# Patient Record
Sex: FEMALE | Race: BLACK OR AFRICAN AMERICAN | NOT HISPANIC OR LATINO | Employment: FULL TIME | URBAN - METROPOLITAN AREA
[De-identification: names, ages, dates, MRNs, and addresses within clinical notes are randomized per-mention and may not be internally consistent; named-entity substitution may affect disease eponyms.]

---

## 2023-12-06 ENCOUNTER — OFFICE VISIT (OUTPATIENT)
Dept: URGENT CARE | Facility: CLINIC | Age: 28
End: 2023-12-06
Payer: COMMERCIAL

## 2023-12-06 VITALS
HEART RATE: 69 BPM | BODY MASS INDEX: 30.91 KG/M2 | HEIGHT: 62 IN | DIASTOLIC BLOOD PRESSURE: 85 MMHG | RESPIRATION RATE: 20 BRPM | OXYGEN SATURATION: 100 % | TEMPERATURE: 98 F | WEIGHT: 168 LBS | SYSTOLIC BLOOD PRESSURE: 130 MMHG

## 2023-12-06 DIAGNOSIS — R11.0 NAUSEA: Primary | ICD-10-CM

## 2023-12-06 DIAGNOSIS — R51.9 ACUTE NONINTRACTABLE HEADACHE, UNSPECIFIED HEADACHE TYPE: ICD-10-CM

## 2023-12-06 LAB
CTP QC/QA: YES
POC MOLECULAR INFLUENZA A AGN: NEGATIVE
POC MOLECULAR INFLUENZA B AGN: NEGATIVE

## 2023-12-06 PROCEDURE — 99203 PR OFFICE/OUTPT VISIT, NEW, LEVL III, 30-44 MIN: ICD-10-PCS | Mod: ,,,

## 2023-12-06 PROCEDURE — 87502 INFLUENZA DNA AMP PROBE: CPT | Mod: QW,,,

## 2023-12-06 PROCEDURE — 99203 OFFICE O/P NEW LOW 30 MIN: CPT | Mod: ,,,

## 2023-12-06 PROCEDURE — 87502 POCT INFLUENZA A/B MOLECULAR: ICD-10-PCS | Mod: QW,,,

## 2023-12-06 RX ORDER — ONDANSETRON 4 MG/1
4 TABLET, ORALLY DISINTEGRATING ORAL EVERY 8 HOURS PRN
Qty: 15 TABLET | Refills: 0 | Status: SHIPPED | OUTPATIENT
Start: 2023-12-06

## 2023-12-06 RX ORDER — DICYCLOMINE HYDROCHLORIDE 10 MG/1
10 CAPSULE ORAL 2 TIMES DAILY PRN
Qty: 15 CAPSULE | Refills: 0 | Status: SHIPPED | OUTPATIENT
Start: 2023-12-06

## 2023-12-06 NOTE — PROGRESS NOTES
"Subjective:      Patient ID: Coco Cee is a 28 y.o. female.    Vitals:  height is 5' 2" (1.575 m) and weight is 76.2 kg (168 lb). Her oral temperature is 98.3 °F (36.8 °C). Her blood pressure is 130/85 and her pulse is 69. Her respiration is 20 and oxygen saturation is 100%.     Chief Complaint: Headache (Headache, body aches, nausea x 2 days.)    Patient is a 28-year-old female that presents complaining of headache, body aches, nausea x2 days vomiting on Monday but none today. Patient denies any SOB, CP, rash, v/d, or neck stiffness.      Headache   Associated symptoms include nausea.       Constitution:        Body aches   Gastrointestinal:  Positive for nausea.   Neurological:  Positive for headaches.      Objective:     Physical Exam   Constitutional: She is oriented to person, place, and time.  Non-toxic appearance. She does not appear ill.   HENT:   Ears:   Right Ear: Tympanic membrane, external ear and ear canal normal.   Left Ear: Tympanic membrane, external ear and ear canal normal.   Mouth/Throat: Mucous membranes are moist. No posterior oropharyngeal erythema. Oropharynx is clear.   Eyes: Conjunctivae are normal.   Cardiovascular: Normal rate and normal pulses.   Pulmonary/Chest: Effort normal and breath sounds normal.   Abdominal: Normal appearance and bowel sounds are normal. Soft. There is abdominal tenderness (Generalized).   Musculoskeletal: Normal range of motion.         General: Normal range of motion.   Neurological: She is alert and oriented to person, place, and time.   Skin: Skin is warm, dry and no rash. Capillary refill takes less than 2 seconds.   Psychiatric: Her behavior is normal. Mood normal.       Assessment:     1. Nausea    2. Acute nonintractable headache, unspecified headache type           Previous History      Review of patient's allergies indicates:  No Known Allergies    Past Medical History:   Diagnosis Date    Known health problems: none      Current Outpatient " "Medications   Medication Instructions    dicyclomine (BENTYL) 10 mg, Oral, 2 times daily PRN    ondansetron (ZOFRAN-ODT) 4 mg, Oral, Every 8 hours PRN     Past Surgical History:   Procedure Laterality Date    NO PAST SURGERIES       Family History   Problem Relation Age of Onset    Seizures Mother     Diabetes Father        Social History     Tobacco Use    Smoking status: Never    Smokeless tobacco: Never   Substance Use Topics    Alcohol use: Not Currently    Drug use: Never        Physical Exam      Vital Signs Reviewed   /85 (BP Location: Right arm)   Pulse 69   Temp 98.3 °F (36.8 °C) (Oral)   Resp 20   Ht 5' 2" (1.575 m)   Wt 76.2 kg (168 lb)   LMP 12/01/2023   SpO2 100%   BMI 30.73 kg/m²        Procedures    Procedures     Labs     Results for orders placed or performed in visit on 12/06/23   POCT Influenza A/B MOLECULAR   Result Value Ref Range    POC Molecular Influenza A Ag Negative Negative, Not Reported    POC Molecular Influenza B Ag Negative Negative, Not Reported     Acceptable Yes       Plan:       Nausea  -     POCT Influenza A/B MOLECULAR  -     ondansetron (ZOFRAN-ODT) 4 MG TbDL; Take 1 tablet (4 mg total) by mouth every 8 (eight) hours as needed (nausea).  Dispense: 15 tablet; Refill: 0  -     dicyclomine (BENTYL) 10 MG capsule; Take 1 capsule (10 mg total) by mouth 2 (two) times daily as needed (abdominal cramps).  Dispense: 15 capsule; Refill: 0    Acute nonintractable headache, unspecified headache type        Drink lots of fluids. Clear liquids today, then slowly resume your usual diet starting with crackers, toast, soup when your appetite returns. Bananas, rice, applesauce, and toast to firm up stool when your appetite returns.     Bentyl as needed for abdominal cramps.     Zofran as needed for nausea.    Go to ER for any high fever, worsening pains, or vomiting unrelieved by Zofran.              "

## 2023-12-06 NOTE — PATIENT INSTRUCTIONS
Drink lots of fluids. Clear liquids today, then slowly resume your usual diet starting with crackers, toast, soup when your appetite returns. Bananas, rice, applesauce, and toast to firm up stool when your appetite returns.     Bentyl as needed for abdominal cramps.     Zofran as needed for nausea.    Go to ER for any high fever, worsening pains, or vomiting unrelieved by Zofran.

## 2024-03-25 ENCOUNTER — OFFICE VISIT (OUTPATIENT)
Dept: FAMILY MEDICINE | Facility: CLINIC | Age: 29
End: 2024-03-25
Payer: COMMERCIAL

## 2024-03-25 VITALS
SYSTOLIC BLOOD PRESSURE: 114 MMHG | HEIGHT: 62 IN | OXYGEN SATURATION: 99 % | WEIGHT: 176.88 LBS | HEART RATE: 67 BPM | DIASTOLIC BLOOD PRESSURE: 75 MMHG | TEMPERATURE: 99 F | BODY MASS INDEX: 32.55 KG/M2 | RESPIRATION RATE: 18 BRPM

## 2024-03-25 DIAGNOSIS — Z12.4 CERVICAL CANCER SCREENING: ICD-10-CM

## 2024-03-25 DIAGNOSIS — Z00.00 ENCOUNTER FOR MEDICAL EXAMINATION TO ESTABLISH CARE: Primary | ICD-10-CM

## 2024-03-25 DIAGNOSIS — Z00.00 PREVENTATIVE HEALTH CARE: ICD-10-CM

## 2024-03-25 PROCEDURE — 3074F SYST BP LT 130 MM HG: CPT | Mod: CPTII,,, | Performed by: FAMILY MEDICINE

## 2024-03-25 PROCEDURE — 1159F MED LIST DOCD IN RCRD: CPT | Mod: CPTII,,, | Performed by: FAMILY MEDICINE

## 2024-03-25 PROCEDURE — 3078F DIAST BP <80 MM HG: CPT | Mod: CPTII,,, | Performed by: FAMILY MEDICINE

## 2024-03-25 PROCEDURE — 3008F BODY MASS INDEX DOCD: CPT | Mod: CPTII,,, | Performed by: FAMILY MEDICINE

## 2024-03-25 PROCEDURE — 99395 PREV VISIT EST AGE 18-39: CPT | Mod: ,,, | Performed by: FAMILY MEDICINE

## 2024-03-25 PROCEDURE — 1160F RVW MEDS BY RX/DR IN RCRD: CPT | Mod: CPTII,,, | Performed by: FAMILY MEDICINE

## 2024-03-25 RX ORDER — FLUTICASONE PROPIONATE 50 MCG
1 SPRAY, SUSPENSION (ML) NASAL DAILY
COMMUNITY
Start: 2024-02-10

## 2024-03-25 RX ORDER — CETIRIZINE HYDROCHLORIDE 10 MG/1
10 TABLET ORAL DAILY
COMMUNITY

## 2024-03-25 NOTE — PROGRESS NOTES
Coco Cee  03/26/2024  [id number]    Subjective:      Patient ID: Coco Cee is a 29 y.o. female.    Chief Complaint: \[facility] [place] [facility]\"" Care (Last wellness in June with Dr. Nicky Boss in [place].)    Disclaimer:  This note is prepared using voice recognition software and as such is likely to have errors despite attempts at proofreading. Please contact me for questions.     29-year-old female who presents to [facility].  The patient denies any chronic conditions other than food allergies.  She denies history of cervical cancer screening and is interested in having a family within the next 2 years.  She denies family history of ovarian or uterine cancer.  She also denies family history of breast cancer.  She is currently using natural family planning and condoms for contraception.  She denies any previous surgeries, alcohol, tobacco or recreational drug use.    History:  Past Medical History:   Diagnosis Date    Allergy     Known health problems: none      Past Surgical History:   Procedure Laterality Date    NO PAST SURGERIES       Family History   Problem Relation Age of Onset    Seizures Mother     Diabetes Father      Social History     Socioeconomic History    Marital status:    Tobacco Use    Smoking status: Never    Smokeless tobacco: Never   Substance and Sexual Activity    Alcohol use: Not Currently    Drug use: Never    Sexual activity: Yes     Partners: Male     There is no problem list on file for this patient.    Review of patient's allergies indicates:   Allergen Reactions    Coconut Shortness Of Breath and Itching    Avocado (laurus persea)     Tree nuts      Almonds    Watermelon          The following were reviewed at this visit: active problem list, medication list, allergies, family history, social history, and health maintenance.    Medications:  Current Outpatient Medications on File Prior to Visit   Medication Sig Dispense Refill    dicyclomine (BENTYL) 10 MG capsule  "Take 1 capsule (10 mg total) by mouth 2 (two) times daily as needed (abdominal cramps). 15 capsule 0    fluticasone propionate (FLONASE) 50 mcg/actuation nasal spray 1 spray by Each Nostril route once daily.      ondansetron (ZOFRAN-ODT) 4 MG TbDL Take 1 tablet (4 mg total) by mouth every 8 (eight) hours as needed (nausea). 15 tablet 0    cetirizine (ZYRTEC) 10 MG tablet Take 10 mg by mouth once daily.       No current facility-administered medications on file prior to visit.       Review of Systems   Constitutional:  Negative for chills, diaphoresis and fever.   Eyes:  Negative for blurred vision and double vision.   Respiratory:  Negative for cough and shortness of breath.    Cardiovascular:  Negative for chest pain and leg swelling.   Gastrointestinal:  Negative for abdominal pain, diarrhea, nausea and vomiting.   Skin:  Negative for rash.   Neurological:  Negative for dizziness, tremors and headaches.       Objective:     Vitals:    03/25/24 0935   BP: 114/75   BP Location: Right arm   Patient Position: Sitting   Pulse: 67   Resp: 18   Temp: 98.6 °F (37 °C)   TempSrc: Oral   SpO2: 99%   Weight: 80.2 kg (176 lb 14.4 oz)   Height: 5' 2" (1.575 m)     Physical Exam  Vitals reviewed.   Constitutional:       General: She is not in acute distress.     Appearance: Normal appearance. She is not ill-appearing, toxic-appearing or diaphoretic.   HENT:      Head: Normocephalic.   Eyes:      General:         Right eye: No discharge.         Left eye: No discharge.      Extraocular Movements: Extraocular movements intact.      Conjunctiva/sclera: Conjunctivae normal.   Cardiovascular:      Rate and Rhythm: Normal rate and regular rhythm.      Pulses: Normal pulses.      Heart sounds: Normal heart sounds. No murmur heard.     No friction rub. No gallop.   Pulmonary:      Effort: Pulmonary effort is normal. No respiratory distress.      Breath sounds: Normal breath sounds. No stridor. No wheezing, rhonchi or rales. "   Musculoskeletal:         General: Normal range of motion.      Cervical back: Normal range of motion and neck supple. No rigidity.   Skin:     General: Skin is warm and dry.      Coloration: Skin is not pale.   Neurological:      General: No focal deficit present.      Mental Status: She is alert and oriented to person, place, and time. Mental status is at baseline.   Psychiatric:         Mood and Affect: Mood normal.         Behavior: Behavior normal.         Thought Content: Thought content normal.         Judgment: Judgment normal.         Assessment:     1. Encounter for medical examination to establish care    2. Preventative health care    3. Cervical cancer screening      Plan:   Coco was seen today for establish care.    Diagnoses and all orders for this visit:    Encounter for medical examination to establish care  Recommend annual eye exam and biannual dental exams.  Consider starting prenatal vitamins.  Well balanced diet low in sugar/complex carbohydrates and increased vegetable intake encouraged.  Moderate intensity exercise 30 min/day at least 5 days/Wk (total 150 min/Wk) recommended.  Wellness labs ordered.    Preventative health care  -     CBC Auto Differential; Future  -     Comprehensive Metabolic Panel; Future  -     Hemoglobin A1C; Future  -     Lipid Panel; Future  -     TSH; Future  -     Urinalysis, Reflex to Urine Culture; Future    Cervical cancer screening  -     Ambulatory referral/consult to Obstetrics / Gynecology; Future      Follow up: Follow up in about 8 weeks (around 5/20/2024) for Wellness Visit.    After visit summary was printed and given to patient upon discharge today.  Coco Cee was given education on their disease process and medications.

## 2024-03-26 ENCOUNTER — CLINICAL SUPPORT (OUTPATIENT)
Dept: URGENT CARE | Facility: CLINIC | Age: 29
End: 2024-03-26
Payer: COMMERCIAL

## 2024-03-26 DIAGNOSIS — Z00.00 PREVENTATIVE HEALTH CARE: ICD-10-CM

## 2024-03-26 LAB
ALBUMIN SERPL-MCNC: 4.2 G/DL (ref 3.5–5)
ALBUMIN/GLOB SERPL: 1.3 RATIO (ref 1.1–2)
ALP SERPL-CCNC: 60 UNIT/L (ref 40–150)
ALT SERPL-CCNC: 8 UNIT/L (ref 0–55)
AST SERPL-CCNC: 16 UNIT/L (ref 5–34)
BASOPHILS # BLD AUTO: 0.02 X10(3)/MCL
BASOPHILS NFR BLD AUTO: 0.3 %
BILIRUB SERPL-MCNC: 1 MG/DL
BUN SERPL-MCNC: 7.3 MG/DL (ref 7–18.7)
CALCIUM SERPL-MCNC: 9.4 MG/DL (ref 8.4–10.2)
CHLORIDE SERPL-SCNC: 108 MMOL/L (ref 98–107)
CHOLEST SERPL-MCNC: 205 MG/DL
CHOLEST/HDLC SERPL: 4 {RATIO} (ref 0–5)
CO2 SERPL-SCNC: 19 MMOL/L (ref 22–29)
CREAT SERPL-MCNC: 0.76 MG/DL (ref 0.55–1.02)
EOSINOPHIL # BLD AUTO: 0.05 X10(3)/MCL (ref 0–0.9)
EOSINOPHIL NFR BLD AUTO: 0.8 %
ERYTHROCYTE [DISTWIDTH] IN BLOOD BY AUTOMATED COUNT: 12.1 % (ref 11.5–17)
EST. AVERAGE GLUCOSE BLD GHB EST-MCNC: 99.7 MG/DL
GFR SERPLBLD CREATININE-BSD FMLA CKD-EPI: >60 MLS/MIN/1.73/M2
GLOBULIN SER-MCNC: 3.3 GM/DL (ref 2.4–3.5)
GLUCOSE SERPL-MCNC: 98 MG/DL (ref 74–100)
HBA1C MFR BLD: 5.1 %
HCT VFR BLD AUTO: 35.6 % (ref 37–47)
HDLC SERPL-MCNC: 53 MG/DL (ref 35–60)
HGB BLD-MCNC: 12.1 G/DL (ref 12–16)
IMM GRANULOCYTES # BLD AUTO: 0.01 X10(3)/MCL (ref 0–0.04)
IMM GRANULOCYTES NFR BLD AUTO: 0.2 %
LDLC SERPL CALC-MCNC: 140 MG/DL (ref 50–140)
LYMPHOCYTES # BLD AUTO: 1.62 X10(3)/MCL (ref 0.6–4.6)
LYMPHOCYTES NFR BLD AUTO: 26.7 %
MCH RBC QN AUTO: 30.6 PG (ref 27–31)
MCHC RBC AUTO-ENTMCNC: 34 G/DL (ref 33–36)
MCV RBC AUTO: 89.9 FL (ref 80–94)
MONOCYTES # BLD AUTO: 0.35 X10(3)/MCL (ref 0.1–1.3)
MONOCYTES NFR BLD AUTO: 5.8 %
NEUTROPHILS # BLD AUTO: 4.01 X10(3)/MCL (ref 2.1–9.2)
NEUTROPHILS NFR BLD AUTO: 66.2 %
NRBC BLD AUTO-RTO: 0 %
PLATELET # BLD AUTO: 367 X10(3)/MCL (ref 130–400)
PMV BLD AUTO: 9.8 FL (ref 7.4–10.4)
POTASSIUM SERPL-SCNC: 4 MMOL/L (ref 3.5–5.1)
PROT SERPL-MCNC: 7.5 GM/DL (ref 6.4–8.3)
RBC # BLD AUTO: 3.96 X10(6)/MCL (ref 4.2–5.4)
SODIUM SERPL-SCNC: 137 MMOL/L (ref 136–145)
TRIGL SERPL-MCNC: 61 MG/DL (ref 37–140)
TSH SERPL-ACNC: 1.28 UIU/ML (ref 0.35–4.94)
VLDLC SERPL CALC-MCNC: 12 MG/DL
WBC # SPEC AUTO: 6.06 X10(3)/MCL (ref 4.5–11.5)

## 2024-03-26 PROCEDURE — 85025 COMPLETE CBC W/AUTO DIFF WBC: CPT | Performed by: FAMILY MEDICINE

## 2024-03-26 PROCEDURE — 80053 COMPREHEN METABOLIC PANEL: CPT | Performed by: FAMILY MEDICINE

## 2024-03-26 PROCEDURE — 36415 COLL VENOUS BLD VENIPUNCTURE: CPT

## 2024-03-26 PROCEDURE — 83036 HEMOGLOBIN GLYCOSYLATED A1C: CPT | Performed by: FAMILY MEDICINE

## 2024-03-26 PROCEDURE — 80061 LIPID PANEL: CPT | Performed by: FAMILY MEDICINE

## 2024-03-26 PROCEDURE — 84443 ASSAY THYROID STIM HORMONE: CPT | Performed by: FAMILY MEDICINE

## 2024-03-26 NOTE — PROGRESS NOTES
Subjective:      Patient ID: Coco Cee is a 29 y.o. female.    Vitals:  vitals were not taken for this visit.     Chief Complaint: No chief complaint on file.    HPI  ROS   Objective:     Physical Exam    Assessment:     No diagnosis found.    Plan:       There are no diagnoses linked to this encounter.

## 2024-04-10 ENCOUNTER — TELEPHONE (OUTPATIENT)
Dept: FAMILY MEDICINE | Facility: CLINIC | Age: 29
End: 2024-04-10
Payer: COMMERCIAL

## 2024-04-10 NOTE — TELEPHONE ENCOUNTER
----- Message from Lorie Wheat sent at 4/9/2024  2:02 PM CDT -----  Regarding: return call  Type:  Patient Returning Call    Who Called:pt   Who Left Message for Patient:Brockhair   Does the patient know what this is regarding?:no   Would the patient rather a call back or a response via [facility]? C/b   Best Call Back Number:[phone]  Additional Information: pt is returning a call to [place]

## 2024-04-10 NOTE — TELEPHONE ENCOUNTER
----- Message from Belinda Benson MD sent at 4/9/2024 12:13 PM CDT -----  Mildly elevated total cholesterol. Low fat low cholesterol diet recommended. Mildly elevated chloride and decreased CO2. Encourage well balance diet and adequate hydration. No other acute concerns will discuss further during upcoming visit.

## 2024-05-29 ENCOUNTER — OFFICE VISIT (OUTPATIENT)
Dept: FAMILY MEDICINE | Facility: CLINIC | Age: 29
End: 2024-05-29
Payer: COMMERCIAL

## 2024-05-29 VITALS
RESPIRATION RATE: 18 BRPM | BODY MASS INDEX: 33.98 KG/M2 | OXYGEN SATURATION: 98 % | WEIGHT: 184.63 LBS | HEART RATE: 64 BPM | HEIGHT: 62 IN | TEMPERATURE: 99 F | DIASTOLIC BLOOD PRESSURE: 75 MMHG | SYSTOLIC BLOOD PRESSURE: 115 MMHG

## 2024-05-29 DIAGNOSIS — Z00.00 ENCOUNTER FOR PREVENTATIVE ADULT HEALTH CARE EXAMINATION: Primary | ICD-10-CM

## 2024-05-29 DIAGNOSIS — Z12.4 CERVICAL CANCER SCREENING: ICD-10-CM

## 2024-05-29 DIAGNOSIS — E78.9 BORDERLINE HIGH CHOLESTEROL: ICD-10-CM

## 2024-05-29 PROCEDURE — 3074F SYST BP LT 130 MM HG: CPT | Mod: CPTII,,, | Performed by: FAMILY MEDICINE

## 2024-05-29 PROCEDURE — 3008F BODY MASS INDEX DOCD: CPT | Mod: CPTII,,, | Performed by: FAMILY MEDICINE

## 2024-05-29 PROCEDURE — 1159F MED LIST DOCD IN RCRD: CPT | Mod: CPTII,,, | Performed by: FAMILY MEDICINE

## 2024-05-29 PROCEDURE — 99395 PREV VISIT EST AGE 18-39: CPT | Mod: ,,, | Performed by: FAMILY MEDICINE

## 2024-05-29 PROCEDURE — 3044F HG A1C LEVEL LT 7.0%: CPT | Mod: CPTII,,, | Performed by: FAMILY MEDICINE

## 2024-05-29 PROCEDURE — 3078F DIAST BP <80 MM HG: CPT | Mod: CPTII,,, | Performed by: FAMILY MEDICINE

## 2024-05-29 PROCEDURE — 1160F RVW MEDS BY RX/DR IN RCRD: CPT | Mod: CPTII,,, | Performed by: FAMILY MEDICINE

## 2024-05-29 NOTE — PROGRESS NOTES
Patient ID: [id number]     Chief Complaint: Annual Exam (wellness)        HPI:   Disclaimer:  This note is prepared using voice recognition software and as such is likely to have errors despite attempts at proofreading. Please contact me for questions.     Coco Cee is a 29 y.o. female here today for an annual wellness visit. No other complaints today.   The patient admits to a mostly healthy diet but states she intermittently eats concentrated sweets.  Caffeine intake consists of a proximally 2 cups of coffee per day.  She admits to exercising any gym multiple times per week and attempts to burn 900 calories at least Monday-Friday.  No recent ED visits or urgent care visits.  The patient denies chest pain, shortness of breath, nausea, vomiting or diarrhea.  Cervical Cancer Screening - referral to gynecology sent.  Patient was interested in establishing with OBGYN as she is interested in having children in the near future.    Breast Cancer Screening - Due at age 40.  Colon Cancer Screening - Due at age 45  Eye Exam - Last eye exam multiple years ago.  Patient will call for an appointment.  Dental Exam - Last dental visit 1 year ago.  Recommend biannual exams.  Patient will call for an appointment.  Vaccinations -   Immunization History   Administered Date(s) Administered    COVID-19 MRNA, LN-S PF (MODERNA HALF 0.25 ML DOSE) 01/07/2022    COVID-19, MRNA, LN-S, PF (MODERNA FULL 0.5 ML DOSE) 02/12/2021, 03/12/2021    DTaP 1995, 1995, 01/03/1996, 02/13/1997, 11/21/2000    HIB 1995, 1995, 01/03/1996, 02/13/1997    Hepatitis B, Pediatric/Adolescent 1995, 1995, 02/19/1996, 08/01/2006    IPV 1995, 1995, 01/03/1996, 11/21/2000    MMR 01/03/1996, 11/21/2000, 08/01/2006    Meningococcal Conjugate (MCV4P) 07/10/2013    Tdap 08/01/2006        Past Medical History:   Diagnosis Date    Allergy         Past Surgical History:   Procedure Laterality Date    NO PAST SURGERIES          Review of patient's allergies indicates:   Allergen Reactions    Coconut Shortness Of Breath and Itching    Avocado (laurus persea)     Tree nuts      Almonds    Watermelon        Outpatient Medications Marked as Taking for the 5/29/24 encounter (Office Visit) with Belinda Benson MD   Medication Sig Dispense Refill    cetirizine (ZYRTEC) 10 MG tablet Take 10 mg by mouth once daily.      fluticasone propionate (FLONASE) 50 mcg/actuation nasal spray 1 spray by Each Nostril route once daily.         Social History     Socioeconomic History    Marital status:    Tobacco Use    Smoking status: Never    Smokeless tobacco: Never   Substance and Sexual Activity    Alcohol use: Not Currently    Drug use: Never    Sexual activity: Yes     Partners: Male        Family History   Problem Relation Name Age of Onset    Seizures Mother      Diabetes Father          Lab Results   Component Value Date    WBC 6.06 03/26/2024    HGB 12.1 03/26/2024    HCT 35.6 (L) 03/26/2024     03/26/2024    CHOL 205 (H) 03/26/2024    TRIG 61 03/26/2024    HDL 53 03/26/2024    .00 03/26/2024    ALT 8 03/26/2024    AST 16 03/26/2024     03/26/2024    K 4.0 03/26/2024    CREATININE 0.76 03/26/2024    BUN 7.3 03/26/2024    CO2 19 (L) 03/26/2024    TSH 1.281 03/26/2024    HGBA1C 5.1 03/26/2024     Subjective:     Review of Systems:   Review of Systems   Constitutional:  Negative for chills, diaphoresis and fever.   Eyes:  Negative for blurred vision and double vision.   Respiratory:  Negative for cough and shortness of breath.    Cardiovascular:  Negative for chest pain and leg swelling.   Gastrointestinal:  Negative for abdominal pain, diarrhea, nausea and vomiting.   Skin:  Negative for rash.   Neurological:  Negative for dizziness, tremors and headaches.      See HPI for details    Objective:     /75 (BP Location: Right arm, Patient Position: Sitting)   Pulse 64   Temp 98.5 °F (36.9 °C) (Oral)   Resp 18   " Ht 5' 2" (1.575 m)   Wt 83.7 kg (184 lb 9.6 oz)   LMP 05/20/2024   SpO2 98%   BMI 33.76 kg/m²     Physical Exam:  Physical Exam  Constitutional:       General: She is not in acute distress.     Appearance: She is not toxic-appearing or diaphoretic.   HENT:      Head: Normocephalic and atraumatic.      Right Ear: Tympanic membrane, ear canal and external ear normal. There is no impacted cerumen.      Left Ear: Tympanic membrane, ear canal and external ear normal. There is no impacted cerumen.      Nose: Nose normal.      Mouth/Throat:      Mouth: Mucous membranes are moist.      Pharynx: Oropharynx is clear. No oropharyngeal exudate or posterior oropharyngeal erythema.   Eyes:      General: No scleral icterus.     Extraocular Movements: Extraocular movements intact.      Conjunctiva/sclera: Conjunctivae normal.   Cardiovascular:      Rate and Rhythm: Normal rate and regular rhythm.      Heart sounds: Normal heart sounds. No murmur heard.     No friction rub. No gallop.   Pulmonary:      Effort: Pulmonary effort is normal. No respiratory distress.      Breath sounds: Normal breath sounds. No stridor. No wheezing, rhonchi or rales.   Musculoskeletal:         General: Normal range of motion.      Cervical back: Normal range of motion and neck supple. No rigidity.   Lymphadenopathy:      Cervical: No cervical adenopathy.   Skin:     General: Skin is warm and dry.      Coloration: Skin is not pale.   Neurological:      General: No focal deficit present.      Mental Status: She is alert and oriented to person, place, and time. Mental status is at baseline.   Psychiatric:         Mood and Affect: Mood normal.         Behavior: Behavior normal.         Thought Content: Thought content normal.         Judgment: Judgment normal.       Assessment:       ICD-10-CM ICD-9-CM   1. Encounter for preventative adult health care examination  Z00.00 V70.0   2. Cervical cancer screening  Z12.4 V76.2   3. Borderline high cholesterol "  E78.9 272.9      Plan:     Health Maintenance Topics with due status: Not Due       Topic Last Completion Date    Influenza Vaccine Not Due        1. Encounter for preventative adult health care examination    2. Cervical cancer screening  - Ambulatory referral/consult to Obstetrics / Gynecology; Future    3. Borderline high cholesterol  Mildly elevated LDL and total cholesterol.  Trial of lifestyle modifications.  Consider Omega-3 Fatty acids/Fish Oil supplements. Increase dietary fiber intake.  Recommend low fat, low cholesterol diet and exercise.    Follow up in about 1 year (around 5/29/2025) for Wellness Visit.

## 2024-08-20 ENCOUNTER — TELEPHONE (OUTPATIENT)
Dept: FAMILY MEDICINE | Facility: CLINIC | Age: 29
End: 2024-08-20
Payer: COMMERCIAL

## 2024-08-20 NOTE — TELEPHONE ENCOUNTER
----- Message from Silvino Quijano sent at 8/20/2024  8:32 AM CDT -----  .Type:  Needs Medical Advice    Who Called: Coco   Symptoms (please be specific):    How long has patient had these symptoms:    Pharmacy name and phone #:    Would the patient rather a call back or a response via [facility]?   Best Call Back Number: [phone]  Additional Information: Patient requested to speak with the nurse as the OBGYN that Dr. Benson had referred her to is not taking any new patients. Please call her back today.

## 2024-11-06 ENCOUNTER — PATIENT OUTREACH (OUTPATIENT)
Dept: ADMINISTRATIVE | Facility: HOSPITAL | Age: 29
End: 2024-11-06
Payer: COMMERCIAL

## 2024-11-06 NOTE — PROGRESS NOTES
Patient states she made and appointment with 2 offices. She will either see Dr. Griffiths or Marguerite LOWE

## 2024-12-21 ENCOUNTER — HOSPITAL ENCOUNTER (EMERGENCY)
Facility: HOSPITAL | Age: 29
Discharge: HOME | End: 2024-12-21
Attending: STUDENT IN AN ORGANIZED HEALTH CARE EDUCATION/TRAINING PROGRAM
Payer: COMMERCIAL

## 2024-12-21 VITALS
RESPIRATION RATE: 18 BRPM | TEMPERATURE: 98 F | WEIGHT: 198 LBS | HEART RATE: 67 BPM | SYSTOLIC BLOOD PRESSURE: 103 MMHG | DIASTOLIC BLOOD PRESSURE: 65 MMHG | BODY MASS INDEX: 36.21 KG/M2 | OXYGEN SATURATION: 100 %

## 2024-12-21 DIAGNOSIS — R10.30 LOWER ABDOMINAL PAIN: Primary | ICD-10-CM

## 2024-12-21 DIAGNOSIS — N83.201 CYST OF RIGHT OVARY: ICD-10-CM

## 2024-12-21 DIAGNOSIS — U07.1 COVID-19: ICD-10-CM

## 2024-12-21 LAB
ALBUMIN SERPL-MCNC: 3.5 G/DL (ref 3.5–5)
ALBUMIN/GLOB SERPL: 0.9 RATIO (ref 1.1–2)
ALP SERPL-CCNC: 56 UNIT/L (ref 40–150)
ALT SERPL-CCNC: 10 UNIT/L (ref 0–55)
AMORPH URATE CRY URNS QL MICRO: ABNORMAL /UL
ANION GAP SERPL CALC-SCNC: 11 MEQ/L
AST SERPL-CCNC: 15 UNIT/L (ref 5–34)
B-HCG FREE SERPL-ACNC: ABNORMAL MIU/ML
B-HCG UR QL: POSITIVE
BACTERIA #/AREA URNS AUTO: ABNORMAL /HPF
BASOPHILS # BLD AUTO: 0.01 X10(3)/MCL
BASOPHILS NFR BLD AUTO: 0.2 %
BILIRUB SERPL-MCNC: 0.3 MG/DL
BILIRUB UR QL STRIP.AUTO: NEGATIVE
BUN SERPL-MCNC: 5.9 MG/DL (ref 7–18.7)
CALCIUM SERPL-MCNC: 9.6 MG/DL (ref 8.4–10.2)
CHLORIDE SERPL-SCNC: 107 MMOL/L (ref 98–107)
CLARITY UR: ABNORMAL
CO2 SERPL-SCNC: 18 MMOL/L (ref 22–29)
COLOR UR AUTO: YELLOW
CREAT SERPL-MCNC: 0.53 MG/DL (ref 0.55–1.02)
CREAT/UREA NIT SERPL: 11
EOSINOPHIL # BLD AUTO: 0.08 X10(3)/MCL (ref 0–0.9)
EOSINOPHIL NFR BLD AUTO: 1.5 %
ERYTHROCYTE [DISTWIDTH] IN BLOOD BY AUTOMATED COUNT: 11.8 % (ref 11.5–17)
FLUAV AG UPPER RESP QL IA.RAPID: NOT DETECTED
FLUBV AG UPPER RESP QL IA.RAPID: NOT DETECTED
GFR SERPLBLD CREATININE-BSD FMLA CKD-EPI: >60 ML/MIN/1.73/M2
GLOBULIN SER-MCNC: 3.7 GM/DL (ref 2.4–3.5)
GLUCOSE SERPL-MCNC: 101 MG/DL (ref 74–100)
GLUCOSE UR QL STRIP: NORMAL
HCT VFR BLD AUTO: 34.2 % (ref 37–47)
HGB BLD-MCNC: 11.6 G/DL (ref 12–16)
HGB UR QL STRIP: NEGATIVE
IMM GRANULOCYTES # BLD AUTO: 0.01 X10(3)/MCL (ref 0–0.04)
IMM GRANULOCYTES NFR BLD AUTO: 0.2 %
KETONES UR QL STRIP: NEGATIVE
LEUKOCYTE ESTERASE UR QL STRIP: NEGATIVE
LYMPHOCYTES # BLD AUTO: 1.26 X10(3)/MCL (ref 0.6–4.6)
LYMPHOCYTES NFR BLD AUTO: 23.2 %
MCH RBC QN AUTO: 30 PG (ref 27–31)
MCHC RBC AUTO-ENTMCNC: 33.9 G/DL (ref 33–36)
MCV RBC AUTO: 88.4 FL (ref 80–94)
MONOCYTES # BLD AUTO: 0.53 X10(3)/MCL (ref 0.1–1.3)
MONOCYTES NFR BLD AUTO: 9.7 %
MUCOUS THREADS URNS QL MICRO: ABNORMAL /LPF
NEUTROPHILS # BLD AUTO: 3.55 X10(3)/MCL (ref 2.1–9.2)
NEUTROPHILS NFR BLD AUTO: 65.2 %
NITRITE UR QL STRIP: NEGATIVE
NRBC BLD AUTO-RTO: 0 %
PH UR STRIP: 6.5 [PH]
PLATELET # BLD AUTO: 304 X10(3)/MCL (ref 130–400)
PMV BLD AUTO: 9 FL (ref 7.4–10.4)
POTASSIUM SERPL-SCNC: 3.7 MMOL/L (ref 3.5–5.1)
PROT SERPL-MCNC: 7.2 GM/DL (ref 6.4–8.3)
PROT UR QL STRIP: NEGATIVE
RBC # BLD AUTO: 3.87 X10(6)/MCL (ref 4.2–5.4)
RBC #/AREA URNS AUTO: ABNORMAL /HPF
SARS-COV-2 RNA RESP QL NAA+PROBE: DETECTED
SODIUM SERPL-SCNC: 136 MMOL/L (ref 136–145)
SP GR UR STRIP.AUTO: 1.02 (ref 1–1.03)
SQUAMOUS #/AREA URNS LPF: ABNORMAL /HPF
UROBILINOGEN UR STRIP-ACNC: NORMAL
WBC # BLD AUTO: 5.44 X10(3)/MCL (ref 4.5–11.5)
WBC #/AREA URNS AUTO: ABNORMAL /HPF

## 2024-12-21 PROCEDURE — 25000003 PHARM REV CODE 250: Performed by: PHYSICIAN ASSISTANT

## 2024-12-21 PROCEDURE — 80053 COMPREHEN METABOLIC PANEL: CPT | Performed by: PHYSICIAN ASSISTANT

## 2024-12-21 PROCEDURE — 63600175 PHARM REV CODE 636 W HCPCS: Performed by: NURSE PRACTITIONER

## 2024-12-21 PROCEDURE — 96374 THER/PROPH/DIAG INJ IV PUSH: CPT

## 2024-12-21 PROCEDURE — 81025 URINE PREGNANCY TEST: CPT | Performed by: PHYSICIAN ASSISTANT

## 2024-12-21 PROCEDURE — 85025 COMPLETE CBC W/AUTO DIFF WBC: CPT | Performed by: PHYSICIAN ASSISTANT

## 2024-12-21 PROCEDURE — 99284 EMERGENCY DEPT VISIT MOD MDM: CPT | Mod: 25

## 2024-12-21 PROCEDURE — 96361 HYDRATE IV INFUSION ADD-ON: CPT

## 2024-12-21 PROCEDURE — 0240U COVID/FLU A&B PCR: CPT | Performed by: NURSE PRACTITIONER

## 2024-12-21 PROCEDURE — 81001 URINALYSIS AUTO W/SCOPE: CPT | Performed by: PHYSICIAN ASSISTANT

## 2024-12-21 PROCEDURE — 84702 CHORIONIC GONADOTROPIN TEST: CPT | Performed by: PHYSICIAN ASSISTANT

## 2024-12-21 RX ORDER — ONDANSETRON 4 MG/1
4 TABLET, ORALLY DISINTEGRATING ORAL EVERY 8 HOURS PRN
Qty: 20 TABLET | Refills: 0 | Status: SHIPPED | OUTPATIENT
Start: 2024-12-21

## 2024-12-21 RX ORDER — ONDANSETRON HYDROCHLORIDE 2 MG/ML
4 INJECTION, SOLUTION INTRAVENOUS
Status: COMPLETED | OUTPATIENT
Start: 2024-12-21 | End: 2024-12-21

## 2024-12-21 RX ORDER — ACETAMINOPHEN 500 MG
1000 TABLET ORAL
Status: COMPLETED | OUTPATIENT
Start: 2024-12-21 | End: 2024-12-21

## 2024-12-21 RX ADMIN — ACETAMINOPHEN 1000 MG: 500 TABLET, FILM COATED ORAL at 07:12

## 2024-12-21 RX ADMIN — ONDANSETRON 4 MG: 2 INJECTION INTRAMUSCULAR; INTRAVENOUS at 07:12

## 2024-12-21 RX ADMIN — SODIUM CHLORIDE, POTASSIUM CHLORIDE, SODIUM LACTATE AND CALCIUM CHLORIDE 1000 ML: 600; 310; 30; 20 INJECTION, SOLUTION INTRAVENOUS at 07:12

## 2024-12-22 NOTE — ED PROVIDER NOTES
Encounter Date: 2024       History     Chief Complaint   Patient presents with    Abdominal Pain     Lower pelvic pain/abdominal pain that started @1630 today. Denies any nausea/vomiting. +COVID/flu on Thursday. Denies any vaginal bleeding.      Patient states that she is approximately 11 weeks pregnant.  Patient states that she began with lower abdominal pain this afternoon.  Patient denies any vaginal bleeding or discharge.  Patient states that 2 days ago she was diagnosed with both COVID and the flu.  Patient states fever, nausea vomiting, and coughing.  .  Patient denies any past medical history.    The history is provided by the spouse and the patient.   Abdominal Pain  The current episode started 2 to 3 hours ago. The onset of the illness was gradual. The abdominal pain is located in the suprapubic region. The abdominal pain does not radiate. The severity of the abdominal pain is 7/10. The other symptoms of the illness include fever, nausea and vomiting. The other symptoms of the illness do not include diarrhea, vaginal discharge or vaginal bleeding.   Additional symptoms associated with the illness include chills.     Review of patient's allergies indicates:   Allergen Reactions    Coconut Shortness Of Breath and Itching    Avocado (laurus persea)     Tree nuts      Almonds    Watermelon      Past Medical History:   Diagnosis Date    Allergy      Past Surgical History:   Procedure Laterality Date    NO PAST SURGERIES       Family History   Problem Relation Name Age of Onset    Seizures Mother      Diabetes Father       Social History     Tobacco Use    Smoking status: Never    Smokeless tobacco: Never   Substance Use Topics    Alcohol use: Not Currently    Drug use: Never     Review of Systems   Constitutional:  Positive for chills and fever.   HENT:  Positive for congestion.    Eyes: Negative.    Respiratory:  Positive for cough.    Cardiovascular: Negative.  Negative for chest pain.    Gastrointestinal:  Positive for abdominal pain, nausea and vomiting. Negative for diarrhea.   Endocrine: Negative.    Genitourinary: Negative.  Negative for vaginal bleeding and vaginal discharge.   Musculoskeletal: Negative.    Skin: Negative.    Allergic/Immunologic: Negative.    Neurological: Negative.    Hematological: Negative.    Psychiatric/Behavioral: Negative.     All other systems reviewed and are negative.      Physical Exam     Initial Vitals [12/21/24 1843]   BP Pulse Resp Temp SpO2   100/70 71 20 97.8 °F (36.6 °C) 100 %      MAP       --         Physical Exam    Nursing note and vitals reviewed.  Constitutional: She appears well-developed and well-nourished. No distress.   HENT:   Head: Normocephalic and atraumatic. Mouth/Throat: Oropharynx is clear and moist.   Eyes: Conjunctivae and EOM are normal. Pupils are equal, round, and reactive to light.   Neck: Neck supple.   Normal range of motion.  Cardiovascular:  Normal rate, regular rhythm, normal heart sounds and intact distal pulses.           Pulmonary/Chest: Breath sounds normal. No respiratory distress. She has no wheezes.   Abdominal: Abdomen is soft. Bowel sounds are normal. She exhibits no distension. There is no abdominal tenderness.   Musculoskeletal:         General: No tenderness or edema. Normal range of motion.      Cervical back: Normal range of motion and neck supple.     Neurological: She is alert and oriented to person, place, and time. She has normal strength. GCS score is 15. GCS eye subscore is 4. GCS verbal subscore is 5. GCS motor subscore is 6.   Skin: Skin is warm and dry. No rash noted.   Psychiatric: She has a normal mood and affect. Thought content normal.         ED Course   Procedures  Labs Reviewed   COMPREHENSIVE METABOLIC PANEL - Abnormal       Result Value    Sodium 136      Potassium 3.7      Chloride 107      CO2 18 (*)     Glucose 101 (*)     Blood Urea Nitrogen 5.9 (*)     Creatinine 0.53 (*)     Calcium 9.6       Protein Total 7.2      Albumin 3.5      Globulin 3.7 (*)     Albumin/Globulin Ratio 0.9 (*)     Bilirubin Total 0.3      ALP 56      ALT 10      AST 15      eGFR >60      Anion Gap 11.0      BUN/Creatinine Ratio 11     HCG, QUANTITATIVE - Abnormal    Beta HCG Quant 74,051.73 (*)    URINALYSIS, REFLEX TO URINE CULTURE - Abnormal    Color, UA Yellow      Appearance, UA Turbid (*)     Specific Gravity, UA 1.020      pH, UA 6.5      Protein, UA Negative      Glucose, UA Normal      Ketones, UA Negative      Blood, UA Negative      Bilirubin, UA Negative      Urobilinogen, UA Normal      Nitrites, UA Negative      Leukocyte Esterase, UA Negative      RBC, UA None Seen      WBC, UA 0-5      Bacteria, UA Few (*)     Squamous Epithelial Cells, UA Trace      Mucous, UA Trace (*)     Amorphous Crystal, UA Trace (*)    PREGNANCY TEST, URINE RAPID - Abnormal    hCG Qualitative, Urine Positive (*)    CBC WITH DIFFERENTIAL - Abnormal    WBC 5.44      RBC 3.87 (*)     Hgb 11.6 (*)     Hct 34.2 (*)     MCV 88.4      MCH 30.0      MCHC 33.9      RDW 11.8      Platelet 304      MPV 9.0      Neut % 65.2      Lymph % 23.2      Mono % 9.7      Eos % 1.5      Basophil % 0.2      Lymph # 1.26      Neut # 3.55      Mono # 0.53      Eos # 0.08      Baso # 0.01      IG# 0.01      IG% 0.2      NRBC% 0.0     COVID/FLU A&B PCR - Abnormal    Influenza A PCR Not Detected      Influenza B PCR Not Detected      SARS-CoV-2 PCR Detected (*)     Narrative:     The Xpert Xpress SARS-CoV-2/FLU/RSV plus is a rapid, multiplexed real-time PCR test intended for the simultaneous qualitative detection and differentiation of SARS-CoV-2, Influenza A, Influenza B, and respiratory syncytial virus (RSV) viral RNA in either nasopharyngeal swab or nasal swab specimens.         CBC W/ AUTO DIFFERENTIAL    Narrative:     The following orders were created for panel order CBC auto differential.  Procedure                               Abnormality         Status                      ---------                               -----------         ------                     CBC with Differential[[id number]]       Abnormal            Final result                 Please view results for these tests on the individual orders.          Imaging Results              US OB <14 Wks, TransAbd, Single Gestation (Final result)  Result time 12/21/24 21:13:04   Procedure changed from US OB <14 Wks TransAbd & TransVag, Single Gestation (XPD)     Final result by John Hernadez MD (12/21/24 21:13:04)                   Impression:      1. Single live intrauterine pregnancy with estimated gestational age of 11 weeks, 3 days.  Fetal heart rate of 163 beats per minute.  2. Sonographic findings most consistent with small subchorionic hematoma/hemorrhage.      Electronically signed by: John Hernadez MD  Date:    12/21/2024  Time:    21:13               Narrative:    EXAMINATION:  US OB <14 WEEKS TRANSABDOM, SINGLE GESTATION    CLINICAL HISTORY:  abdominal pain in early pregnancy;    TECHNIQUE:  Multiple grayscale and color Doppler images of the pelvis were obtained utilizing the transabdominal and transvaginal technique.  Spectral Doppler evaluation of both ovaries was performed.    COMPARISON:  None    FINDINGS:  Uterus:    Measures 10.1 x 6.5 x 6.0 cm.    Closed cervix.    Average crown-rump length of 4.7 cm with estimated gestational age of 11 weeks, 3 days.    There appears to be a small subchorionic hemorrhage/hematoma measuring 2.1 cm in longest dimension.    Fetal heart rate of 163 beats per minute    Right ovary:    Measures 4.4 x 3.1 x 2.4 cm.    Simple right ovarian cyst measures 1.9 cm.  No suspicious features.    Spectral Doppler evaluation demonstrates normal arteriovenous waveforms.    Left ovary:    Measures 2.8 x 1.5 x 1.8 cm.    Normal sonographic appearance.    Spectral Doppler evaluation demonstrates normal arteriovenous waveforms.    Other:    No free fluid                                        Medications   acetaminophen tablet 1,000 mg (1,000 mg Oral Given 24)   lactated ringers bolus 1,000 mL (0 mLs Intravenous Stopped 24)   ondansetron injection 4 mg (4 mg Intravenous Given 24)     Medical Decision Making  Patient states that she is approximately 11 weeks pregnant.  Patient states that she began with lower abdominal pain this afternoon.  Patient denies any vaginal bleeding or discharge.  Patient states that 2 days ago she was diagnosed with both COVID and the flu.  Patient states fever, nausea vomiting, and coughing.  .  Patient denies any past medical history.    The history is provided by the spouse and the patient.   Abdominal Pain  The current episode started 2 to 3 hours ago. The onset of the illness was gradual. The abdominal pain is located in the suprapubic region. The abdominal pain does not radiate. The severity of the abdominal pain is 7/10. The other symptoms of the illness include fever, nausea and vomiting. The other symptoms of the illness do not include diarrhea, vaginal discharge or vaginal bleeding.   Additional symptoms associated with the illness include chills.       Amount and/or Complexity of Data Reviewed  Labs: ordered. Decision-making details documented in ED Course.  Radiology: ordered. Decision-making details documented in ED Course.  Discussion of management or test interpretation with external provider(s): Differential diagnosis (including but not limited to):   Judging by the patient's chief complaint and pertinent history, the patient has the following possible differential diagnoses, including but not limited to the following.  Some of these are deemed to be lower likelihood and some more likely based on my physical exam and history combined with possible lab work and/or imaging studies.   Please see the pertinent studies, and refer to the HPI.  Some of these diagnoses will take further evaluation to fully rule out,  perhaps as an outpatient and the patient was encouraged to follow up when discharged for more comprehensive evaluation.  COVID, flu, viral illness, gastroenteritis, threatened miscarriage, miscarriage  Patient is positive for COVID.  Patient's labs are otherwise unremarkable.  Patient's beta quant level is appropriate for 11 weeks of pregnancy.  Patient's Ob ultrasound shows a single live IUP with gestational age of 11 weeks and 3 days with a fetal heart rate of 163 beats per minute.  Also shows a small subchorionic hemorrhage and a simple right ovarian cyst measuring 1.9 cm.  Patient was given Tylenol p.o., Zofran IV, and a L of IV fluids in the ED.  Patient states that her abdominal pain has resolved and her nausea and vomiting have resolved as well and she feels improved.  Discussed with patient that her symptoms are most likely attributed to her being to COVID.  Instructed patient to follow up with her OB and PCP.  Patient was given ED return precautions.      Risk  Prescription drug management.               ED Course as of 12/21/24 2218   Sat Dec 21, 2024   2200 Comprehensive metabolic panel(!) [AB]   2200 CBC auto differential(!) [AB]   2200 hCG, quantitative, pregnancy(!) [AB]   2200 Beta HCG Quant(!): 74,051.73 [AB]   2200 Pregnancy, urine rapid(!) [AB]   2200 hCG Qualitative, Urine(!): Positive [AB]   2200 Urinalysis, Reflex to Urine Culture(!) [AB]   2200 COVID/FLU A&B PCR(!) [AB]   2200 Influenza A, Molecular: Not Detected [AB]   2200 Influenza B, Molecular: Not Detected [AB]   2200 SARS-CoV2 (COVID-19) Qualitative PCR(!): Detected [AB]   2200 US OB <14 Wks, TransAbd, Single Gestation  Ob ultrasound shows a single live IUP with a gestational age of 11 weeks and 3 days.  Fetal heart rate is 163 beats per minute.  There is a small subchorionic hemorrhage.  There is a simple right ovarian cyst measuring 1.9 cm. [AB]      ED Course User Index  [AB] Mey Beal, MARILYNP                           Clinical  Impression:  Final diagnoses:  [R10.30] Lower abdominal pain (Primary)  [U07.1] COVID-19  [N83.201] Cyst of right ovary          ED Disposition Condition    Discharge Stable          ED Prescriptions       Medication Sig Dispense Start Date End Date Auth. Provider    ondansetron (ZOFRAN-ODT) 4 MG TbDL Take 1 tablet (4 mg total) by mouth every 8 (eight) hours as needed (Nausea). 20 tablet 12/21/2024 -- Mey Beal FNP          Follow-up Information       Follow up With Specialties Details Why Contact Info    Belinda Benson MD Family Medicine In 3 days  [address] [facility] [address]  [facility] [address]  [phone]      Armond Griffiths CNM Obstetrics In 3 days  [address] [facility] [address]  [phone]               Mey Beal FNP  12/21/24 7431

## 2024-12-22 NOTE — FIRST PROVIDER EVALUATION
Medical screening examination initiated.  I have conducted a focused provider triage encounter, findings are as follows:    Brief history of present illness:  29-year-old  11 week pregnant female presents to ED for evaluation of her abdominal pain radiating to her back.  Denies any vaginal bleeding.  Patient also reports to constipation, LBM Wednesday. Diagnosed on Thursday with COVID and flu. OB, Jocelyn Griffiths.     Vitals:    24 1843   BP: 100/70   Pulse: 71   Resp: 20   Temp: 97.8 °F (36.6 °C)   TempSrc: Oral   SpO2: 100%   Weight: 89.8 kg (198 lb)       Pertinent physical exam:  Patient is awake and alert and oriented.  Ambulatory to triage.  In no acute distress.      Brief workup plan:  labs, UA, UPT, Beta    Preliminary workup initiated; this workup will be continued and followed by the physician or advanced practice provider that is assigned to the patient when roomed.

## 2025-01-17 ENCOUNTER — TELEPHONE (OUTPATIENT)
Dept: MATERNAL FETAL MEDICINE | Facility: CLINIC | Age: 30
End: 2025-01-17
Payer: COMMERCIAL

## 2025-01-17 DIAGNOSIS — O28.3 ABNORMAL ULTRASONIC FINDING ON ANTENATAL SCREENING OF MOTHER: Primary | ICD-10-CM

## 2025-01-24 PROBLEM — O99.212 SEVERE OBESITY DUE TO EXCESS CALORIES AFFECTING PREGNANCY IN SECOND TRIMESTER: Status: ACTIVE | Noted: 2025-01-24

## 2025-01-24 PROBLEM — O28.5 ABNORMAL GENETIC TEST DURING PREGNANCY: Status: ACTIVE | Noted: 2025-01-24

## 2025-01-24 PROBLEM — E66.01 SEVERE OBESITY DUE TO EXCESS CALORIES AFFECTING PREGNANCY IN SECOND TRIMESTER: Status: ACTIVE | Noted: 2025-01-24

## 2025-01-24 NOTE — PROGRESS NOTES
Maternal Fetal Medicine New Consult    Subjective:     Patient ID: [id number]    Chief Complaint: mfm consult w/us      HPI: 30 y.o.  female  at 16w2d gestation with Estimated Date of Delivery: 25 by early US, not consistent with LMP. She is sent for MFM consultation for abnormal cell free DNA.     She had cell free DNA that was high-risk due to low fetal fraction.  She just received results from her repeat test that was low risk/negative (fetal on patient's [facility]).  She had elevated BMI greater than 36 out initial OB visit. S/o Dalton present for visit.       She denies any personal or family history of aneuploidy or anomalies. She denies any exposure to high fevers, viral rashes, illicit drugs or alcohol in this pregnancy.  She denies any leaking fluid, vaginal bleeding, contractions, decreased fetal movement. Denies headaches, visual disturbances, or epigastric pain.       Pregnancy complications include:  Patient Active Problem List   Diagnosis    Abnormal genetic test during pregnancy    Increased BMI affecting pregnancy in second trimester       Past Medical History:   Diagnosis Date    Allergy     Anemia 2019    not currently       Past Surgical History:   Procedure Laterality Date    NO PAST SURGERIES         Family History   Problem Relation Name Age of Onset    Diabetes Father       labor Mother      Miscarriages / Stillbirths Mother      Seizures Mother         Social History     Socioeconomic History    Marital status:    Tobacco Use    Smoking status: Never     Passive exposure: Never    Smokeless tobacco: Never   Substance and Sexual Activity    Alcohol use: Not Currently    Drug use: Never    Sexual activity: Yes     Partners: Male       Current Outpatient Medications   Medication Sig Dispense Refill    cetirizine (ZYRTEC) 10 MG tablet Take 10 mg by mouth once daily.      fluticasone propionate (FLONASE) 50 mcg/actuation nasal spray 1 spray by Each Nostril route once  "daily.      prenatal 168-iron-folic-omega3 (ONE-A-DAY PRENATAL-1) 27 mg iron- 800 mcg-235 mg Cap Take by mouth.      ondansetron (ZOFRAN-ODT) 4 MG TbDL Take 1 tablet (4 mg total) by mouth every 8 (eight) hours as needed (Nausea). (Patient not taking: Reported on 1/27/2025) 20 tablet 0     No current facility-administered medications for this visit.       Review of patient's allergies indicates:   Allergen Reactions    Coconut Shortness Of Breath and Itching    Avocado (laurus persea)     Tree nuts      Almonds    Watermelon         Review of Systems   12 point review of systems conducted, negative except as stated in the history of present illness. See HPI for details.      Objective:     Visit Vitals  /68 (BP Location: Left arm, Patient Position: Sitting)   Pulse 80   Ht 5' 2" (1.575 m)   Wt 91.7 kg (202 lb 3.2 oz)   LMP 10/09/2024 (Approximate)   BMI 36.98 kg/m²        Physical Exam  Vitals and nursing note reviewed.   Constitutional:       General: She is not in acute distress.     Appearance: Normal appearance.      Comments: Increased BMI   HENT:      Head: Normocephalic and atraumatic.      Nose: Nose normal. No congestion.      Mouth/Throat:      Pharynx: Oropharynx is clear.   Eyes:      General: No scleral icterus.     Conjunctiva/sclera: Conjunctivae normal.   Cardiovascular:      Rate and Rhythm: Normal rate and regular rhythm.   Pulmonary:      Effort: No respiratory distress.      Breath sounds: Normal breath sounds. No wheezing.   Abdominal:      General: Abdomen is flat.      Palpations: Abdomen is soft.      Tenderness: There is no abdominal tenderness. There is no right CVA tenderness, left CVA tenderness or guarding.      Comments: No CVA tenderness gravid uterus.    Musculoskeletal:         General: Normal range of motion.      Cervical back: Neck supple.      Right lower leg: No edema.      Left lower leg: No edema.   Skin:     General: Skin is warm.      Findings: No bruising or rash. "   Neurological:      General: No focal deficit present.      Mental Status: She is oriented to person, place, and time.      Deep Tendon Reflexes: Reflexes normal.      Comments: Normal reflexes   Psychiatric:         Mood and Affect: Mood normal.         Behavior: Behavior normal.         Thought Content: Thought content normal.         Judgment: Judgment normal.         Assessment/Plan:     30 y.o.  female with IUP at 16w2d    Abnormal cell free DNA with low fetal fraction  Viable IUP with size consistent with established dating on 25  AFV is normal    There are numerous known causes for decreased fetal fraction. This result increases the chance for Trisomy 18, 13 or Triploidy. Repeat cfDNA one additional time is recommended when fetal fraction is low.      Repeat testing was low risk.    Nevertheless, we discussed that Unlike screening tests, diagnostic tests provide definitive answers regarding the presence of fetal chromosome abnormalities. In addition to assessing for the presence of the common fetal aneuploidies, diagnostic tests can assess for abnormalities in the number and structure of all chromosomes. While both procedures are generally quite safe, there is a risk of miscarriage associated with these procedures. For CVS, the estimated risk of miscarriage attributable to the procedure is 1 in 500. For genetic amniocentesis, the estimated risk of miscarriage attributable to the procedure is 1 in 900.    Risks and benefits of testing were discussed today. She declined amniocentesis.  Discussed the need for routine  evaluation.      Elevated BMI greater than 35  Body mass index is 36.98 kg/m².    I discussed the risk of miscarriage in first trimester, recurrent miscarriages, congenital anomalies, hypertension, diabetes,  labor and the higher risk of  section and the higher risk of fetal demise in-utero. There is also higher risk of for excessive fetal weight and large for  gestational age (LGA) fetuses. Mothers with LGA fetuses are at higher risk of prolonged labor,  delivery, shoulder dystocia and birth trauma. LGA neonates are increased risk of fetal hypoxia and intrauterine death, and are at risk to develop diabetes, obesity, metabolic syndrome, asthma and cancer later in life. She was advised of the importance of eating healthy and limiting weight gain to 15-25lbs during the pregnancy, as optimal in this situation. I recommended low calorie, low fat diet avoiding any additional excessive weight gain. Excess weight gain would be associated with gestational hypertension, gestational diabetes and adverse  outcomes, including fetal demise in utero.    I recommend 1 hour GCT between 24-28 weeks' gestation.    With elevated BMI, start fetal testing starting around 37 weeks.       Importance of working on losing weight after the pregnancy is over, especially before a future pregnancy was discussed. Breastfeeding may be an important tool in reducing the postpartum weight retention. Fetal risks were discussed with short term risk of fetal/ obesity and long term risk of adolescent component of metabolic syndrome.      Preeclampsia prophylaxis  With her risk factors for preeclampsia including  nulliparity, BMI over 30, and  ancestry, discussed recommendations for low dose aspirin use to decrease risks for adverse outcomes, including preeclampsia, low birth weight and  delivery. Low-dose aspirin reduced the risk for preeclampsia by 15% in clinical trials and reduced the risk for  birth by 20% and FGR by 20%, and  mortality by around 20%.  After discussing risks/benefits of its use, it was agreed to start asa 81 mg BID, due to multiple risk factors for preeclampsia. After discussing benefits (more effective than daily) vs potential risks (less data on safety with use at delivery), she agreed to start low dose aspirin twice daily and  continue until 34 6/7weeks, then decrease to once daily until delivery.. Preeclampsia precautions given.    Patient was counseled on association of abnormal fetal fraction with aneuploidy.  However patient was advised that follow-up test negative makes risk of aneuploidy very unlikely.  The screening nature of a cell free DNA test was discussed. Patient offered amnio that was politely declined.  Importance of healthy eating and weight gain discussed.  Proper weight gain recommendations reviewed.  Patient was started on aspirin b.i.d.        Follow up in about 4 weeks (around 2/24/2025) for MFM Followup, Level 2 Ultrasound.     Future Appointments   Date Time Provider Department Center   5/30/2025  9:45 AM Belinda Benson MD [facility]        Patient was evaluated by GILDA Dalal, and and Dr. Galvan.  Final assessment and recommendations as stated above were made by Dr. Galvan.    This note was created with the assistance of [employer] voice recognition software. There may be transcription errors as a result of using this technology, however minimal. Effort has been made to ensure accuracy of transcription, but any obvious errors or omissions should be clarified with the author of the document.

## 2025-01-27 ENCOUNTER — PROCEDURE VISIT (OUTPATIENT)
Dept: MATERNAL FETAL MEDICINE | Facility: CLINIC | Age: 30
End: 2025-01-27
Payer: COMMERCIAL

## 2025-01-27 ENCOUNTER — OFFICE VISIT (OUTPATIENT)
Dept: MATERNAL FETAL MEDICINE | Facility: CLINIC | Age: 30
End: 2025-01-27
Payer: COMMERCIAL

## 2025-01-27 VITALS
WEIGHT: 202.19 LBS | DIASTOLIC BLOOD PRESSURE: 68 MMHG | HEIGHT: 62 IN | SYSTOLIC BLOOD PRESSURE: 106 MMHG | BODY MASS INDEX: 37.21 KG/M2 | HEART RATE: 80 BPM

## 2025-01-27 DIAGNOSIS — O99.212 SEVERE OBESITY DUE TO EXCESS CALORIES AFFECTING PREGNANCY IN SECOND TRIMESTER: ICD-10-CM

## 2025-01-27 DIAGNOSIS — O28.3 ABNORMAL ULTRASONIC FINDING ON ANTENATAL SCREENING OF MOTHER: ICD-10-CM

## 2025-01-27 DIAGNOSIS — O28.5 ABNORMAL GENETIC TEST DURING PREGNANCY: Primary | ICD-10-CM

## 2025-01-27 DIAGNOSIS — E66.01 SEVERE OBESITY DUE TO EXCESS CALORIES AFFECTING PREGNANCY IN SECOND TRIMESTER: ICD-10-CM

## 2025-01-27 PROCEDURE — 3008F BODY MASS INDEX DOCD: CPT | Mod: CPTII,S$GLB,, | Performed by: OBSTETRICS & GYNECOLOGY

## 2025-01-27 PROCEDURE — 3074F SYST BP LT 130 MM HG: CPT | Mod: CPTII,S$GLB,, | Performed by: OBSTETRICS & GYNECOLOGY

## 2025-01-27 PROCEDURE — 1159F MED LIST DOCD IN RCRD: CPT | Mod: CPTII,S$GLB,, | Performed by: OBSTETRICS & GYNECOLOGY

## 2025-01-27 PROCEDURE — 1160F RVW MEDS BY RX/DR IN RCRD: CPT | Mod: CPTII,S$GLB,, | Performed by: OBSTETRICS & GYNECOLOGY

## 2025-01-27 PROCEDURE — 3078F DIAST BP <80 MM HG: CPT | Mod: CPTII,S$GLB,, | Performed by: OBSTETRICS & GYNECOLOGY

## 2025-01-27 PROCEDURE — 76815 OB US LIMITED FETUS(S): CPT | Mod: S$GLB,,, | Performed by: OBSTETRICS & GYNECOLOGY

## 2025-01-27 PROCEDURE — 99203 OFFICE O/P NEW LOW 30 MIN: CPT | Mod: S$GLB,,, | Performed by: OBSTETRICS & GYNECOLOGY

## 2025-01-27 RX ORDER — PRENATAL 168/IRON/FOLIC/OMEGA3 27-800-235
CAPSULE ORAL
COMMUNITY

## 2025-01-27 RX ORDER — ASPIRIN 81 MG/1
81 TABLET ORAL DAILY
Qty: 30 TABLET | Refills: 5 | Status: SHIPPED | OUTPATIENT
Start: 2025-01-27 | End: 2025-01-27

## 2025-01-27 RX ORDER — ASPIRIN 81 MG/1
81 TABLET ORAL 2 TIMES DAILY
Qty: 30 TABLET | Refills: 5 | Status: SHIPPED | OUTPATIENT
Start: 2025-01-27 | End: 2025-07-26

## 2025-02-12 DIAGNOSIS — O28.3 ABNORMAL ULTRASONIC FINDING ON ANTENATAL SCREENING OF MOTHER: Primary | ICD-10-CM

## 2025-02-24 NOTE — PROGRESS NOTES
"  Maternal Fetal Medicine Follow Up    Subjective:     Patient ID: [id number]    Chief Complaint: [facility] follow up with US      HPI: Coco Cee is a 30 y.o. female  at 20w5d gestation with Estimated Date of Delivery: 25  who is here for follow-up consultation by [facility].    She had cell free DNA that was high-risk due to low fetal fraction.  She just received results from her repeat test that was low risk/negative (seen on patient's [employer] /l?k â€¢ splik/ Portal).  She declined amniocentesis.  She had elevated BMI greater than 36 at initial OB visit. She is on low-dose aspirin twice daily for preeclampsia prophylaxis.        Interval history since last M visit: None.. She denies any leaking fluid, vaginal bleeding, contractions, decreased fetal movement. Denies headaches, visual disturbances, or epigastric pain.    Pregnancy complications include:   Problem List[1]    No changes to medical, surgical, family, social, or obstetric history.    Medications:  Current Outpatient Medications   Medication Instructions    aspirin (ECOTRIN) 81 mg, Oral, 2 times daily    cetirizine (ZYRTEC) 10 mg, Daily    fluticasone propionate (FLONASE) 50 mcg/actuation nasal spray 1 spray, Daily    ondansetron (ZOFRAN-ODT) 4 mg, Oral, Every 8 hours PRN    prenatal 168-iron-folic-omega3 (ONE-A-DAY PRENATAL-1) 27 mg iron- 800 mcg-235 mg Cap Take by mouth.       Review of Systems   12 point review of systems conducted, negative except as stated in the history of present illness. See HPI for details.      Objective:     Visit Vitals  /79 (BP Location: Left arm, Patient Position: Sitting)   Pulse 75   Ht 5' 2" (1.575 m)   Wt 90.7 kg (200 lb)   LMP 10/09/2024 (Approximate)   BMI 36.58 kg/m²        Physical Exam  Vitals and nursing note reviewed.   Constitutional:       Appearance: Normal appearance.      Comments: Increased BMI   HENT:      Head: Normocephalic and atraumatic.      Nose: Nose normal. No congestion.   Cardiovascular:      Rate and " Rhythm: Normal rate.   Pulmonary:      Effort: Pulmonary effort is normal.   Skin:     Findings: No rash.   Neurological:      Mental Status: She is alert and oriented to person, place, and time.   Psychiatric:         Mood and Affect: Mood normal.         Behavior: Behavior normal.         Thought Content: Thought content normal.         Judgment: Judgment normal.         Assessment/Plan:     30 y.o.  female with IUP at 20w5d    Initial cell free DNA with low fetal fraction, with normal repeat  There is normal fetal growth and no anomalies seen on fetal anatomy scan on 25.  AFV is normal.      Repeat cell free DNA testing was low risk. She declined amniocentesis.  Reviewed the need for routine  evaluation.      Elevated BMI greater than 35  Body mass index is 36.58 kg/m². With relatively stable weight since last visit, she was advised to continue healthy and low caloric diet. Excess weight gain would be associated with gestational hypertension, gestational diabetes and adverse  outcomes, including fetal demise in utero.    We recommend 1 hour GCT between 24-28 weeks' gestation.    With elevated BMI, start fetal testing starting around 37 weeks. Fetal kick count instructions reviewed.     It is important to lose weight after the pregnancy is over, especially before a future pregnancy was discussed. Breastfeeding may be an important tool in reducing the postpartum weight retention. Fetal risks were discussed with short term risk of fetal/ obesity and long term risk of adolescent component of metabolic syndrome.       Preeclampsia prophylaxis  She was advised to continue asa 81 mg BID until 34 6/7 weeks, then decrease to once daily until delivery. Preeclampsia precautions reviewed.       No anomalies were seen.  Reviewed with the patient the options of doing an amniocentesis that she is still decided not to do.  Continue aspirin b.i.d..  We will plan to see her again in about 5 weeks  to try to complete anatomy scan.    Follow up in about 5 weeks (around 4/3/2025) for Repeat  ultrasound, to complete anatomy scan.     Future Appointments   Date Time Provider Department Center   5/30/2025  9:45 AM Belinda Benson MD [facility]      DEBBIE involvement: Patient was evaluated and examined by Dr. Galvan. MANUEL Oh, helped in pre charting of part of note.    This note was created with the assistance of [email or web address] voice recognition software. There may be transcription errors as a result of using this technology, however minimal. Effort has been made to ensure accuracy of transcription, but any obvious errors or omissions should be clarified with the author of the document.           [1]   Patient Active Problem List  Diagnosis    Abnormal genetic test during pregnancy    Increased BMI affecting pregnancy in second trimester

## 2025-02-27 ENCOUNTER — OFFICE VISIT (OUTPATIENT)
Dept: MATERNAL FETAL MEDICINE | Facility: CLINIC | Age: 30
End: 2025-02-27
Payer: COMMERCIAL

## 2025-02-27 ENCOUNTER — PROCEDURE VISIT (OUTPATIENT)
Dept: MATERNAL FETAL MEDICINE | Facility: CLINIC | Age: 30
End: 2025-02-27
Payer: COMMERCIAL

## 2025-02-27 VITALS
BODY MASS INDEX: 36.8 KG/M2 | DIASTOLIC BLOOD PRESSURE: 79 MMHG | SYSTOLIC BLOOD PRESSURE: 121 MMHG | HEART RATE: 75 BPM | WEIGHT: 200 LBS | HEIGHT: 62 IN

## 2025-02-27 DIAGNOSIS — O99.212 SEVERE OBESITY DUE TO EXCESS CALORIES AFFECTING PREGNANCY IN SECOND TRIMESTER: ICD-10-CM

## 2025-02-27 DIAGNOSIS — O28.3 ABNORMAL ULTRASONIC FINDING ON ANTENATAL SCREENING OF MOTHER: ICD-10-CM

## 2025-02-27 DIAGNOSIS — O28.5 ABNORMAL GENETIC TEST DURING PREGNANCY: Primary | ICD-10-CM

## 2025-02-27 DIAGNOSIS — E66.01 SEVERE OBESITY DUE TO EXCESS CALORIES AFFECTING PREGNANCY IN SECOND TRIMESTER: ICD-10-CM

## 2025-02-27 RX ORDER — ASPIRIN 81 MG/1
81 TABLET ORAL 2 TIMES DAILY
Qty: 30 TABLET | Refills: 5 | Status: SHIPPED | OUTPATIENT
Start: 2025-02-27 | End: 2025-08-26

## 2025-03-07 ENCOUNTER — OFFICE VISIT (OUTPATIENT)
Dept: FAMILY MEDICINE | Facility: CLINIC | Age: 30
End: 2025-03-07
Payer: COMMERCIAL

## 2025-03-07 VITALS
RESPIRATION RATE: 18 BRPM | HEIGHT: 62 IN | SYSTOLIC BLOOD PRESSURE: 120 MMHG | WEIGHT: 203.69 LBS | HEART RATE: 84 BPM | BODY MASS INDEX: 37.48 KG/M2 | OXYGEN SATURATION: 100 % | DIASTOLIC BLOOD PRESSURE: 82 MMHG | TEMPERATURE: 98 F

## 2025-03-07 DIAGNOSIS — L08.9 INFECTED CYST OF SKIN: ICD-10-CM

## 2025-03-07 DIAGNOSIS — L72.9 INFECTED CYST OF SKIN: ICD-10-CM

## 2025-03-07 DIAGNOSIS — J30.9 ALLERGIC RHINITIS, UNSPECIFIED SEASONALITY, UNSPECIFIED TRIGGER: Primary | ICD-10-CM

## 2025-03-07 PROBLEM — O99.212 SEVERE OBESITY DUE TO EXCESS CALORIES AFFECTING PREGNANCY IN SECOND TRIMESTER: Status: RESOLVED | Noted: 2025-01-24 | Resolved: 2025-03-07

## 2025-03-07 PROBLEM — O28.5 ABNORMAL GENETIC TEST DURING PREGNANCY: Status: RESOLVED | Noted: 2025-01-24 | Resolved: 2025-03-07

## 2025-03-07 PROBLEM — E66.01 SEVERE OBESITY DUE TO EXCESS CALORIES AFFECTING PREGNANCY IN SECOND TRIMESTER: Status: RESOLVED | Noted: 2025-01-24 | Resolved: 2025-03-07

## 2025-03-07 NOTE — PROGRESS NOTES
"Subjective:      Patient ID: Coco Cee is a 30 y.o.  female.    Chief Complaint: Establishing Care    Pregnancy: Patient following with Dr. Jocelyn Griffiths with OB-GYN. Patient is taking Aspirin BID until 30 weeks and as a preventative for pre-eclampsia.    Allergic Rhinitis: Patient taking Zyrtec and Flonase OTC.    Right Clavicle Bump: Patient says this started Sunday evening. She's had this bump since 2022, but it's gotten bigger. She denies any drainage. It's a little painful and she's been rubbing an alcohol pad on it.    FH: Patient denies any cancers in a first-degree relative.    Review of Systems   Constitutional:  Positive for activity change. Negative for chills, diaphoresis, fever and unexpected weight change.   HENT:  Negative for hearing loss, rhinorrhea and trouble swallowing.    Eyes:  Negative for discharge and visual disturbance.   Respiratory:  Negative for chest tightness and wheezing.    Cardiovascular:  Negative for chest pain and palpitations.   Gastrointestinal:  Positive for constipation. Negative for blood in stool, diarrhea and vomiting.   Endocrine: Positive for polyuria. Negative for polydipsia.   Genitourinary:  Negative for difficulty urinating, dysuria, hematuria and menstrual problem.   Musculoskeletal:  Negative for arthralgias, joint swelling and neck pain.   Skin:  Negative for rash and wound.        +right clavicle cyst   Allergic/Immunologic: Positive for environmental allergies.   Neurological:  Negative for weakness and headaches.   Psychiatric/Behavioral:  Negative for confusion and dysphoric mood.      Objective:   /82 (BP Location: Right arm, Patient Position: Sitting)   Pulse 84   Temp 98.1 °F (36.7 °C) (Oral)   Resp 18   Ht 5' 2" (1.575 m)   Wt 92.4 kg (203 lb 11.2 oz)   LMP 10/09/2024 (Approximate)   SpO2 100%   BMI 37.26 kg/m²     Physical Exam  Vitals and nursing note reviewed.   Constitutional:       General: She is not in acute " distress.     Appearance: Normal appearance. She is not ill-appearing or toxic-appearing.   HENT:      Head: Normocephalic and atraumatic.   Eyes:      Conjunctiva/sclera: Conjunctivae normal.   Cardiovascular:      Rate and Rhythm: Normal rate and regular rhythm.      Heart sounds: Normal heart sounds. No murmur heard.  Pulmonary:      Effort: Pulmonary effort is normal. No respiratory distress.      Breath sounds: Normal breath sounds.   Abdominal:      General: There is no distension.      Palpations: Abdomen is soft.      Tenderness: There is no abdominal tenderness.   Musculoskeletal:         General: No deformity.      Right lower leg: No edema.      Left lower leg: No edema.   Skin:     General: Skin is warm and dry.      Findings: No lesion or rash.      Comments: 2cm cyst of right clavicle   Neurological:      General: No focal deficit present.      Mental Status: She is alert.      Motor: No weakness.      Coordination: Coordination normal.   Psychiatric:         Mood and Affect: Mood normal.         Behavior: Behavior normal.         Thought Content: Thought content normal.         Judgment: Judgment normal.       Assessment/Plan:   1. Allergic rhinitis, unspecified seasonality, unspecified trigger  Assessment & Plan:  - Stable.  - Continue medications OTC.      2. Infected cyst of skin  Comments:  - Symptomatic treatments reviewed with patient.  - Patient educated regarding red flag symptoms to present to the ER for further evaluation.  Orders:  -     Ambulatory referral/consult to Dermatology; Future; Expected date: 03/14/2025       Follow up in about 6 months (around 9/7/2025) for Wellness.

## 2025-03-20 DIAGNOSIS — E66.01 SEVERE OBESITY DUE TO EXCESS CALORIES AFFECTING PREGNANCY IN SECOND TRIMESTER: ICD-10-CM

## 2025-03-20 DIAGNOSIS — Z36.2 ENCOUNTER FOR FOLLOW-UP ULTRASOUND OF FETAL ANATOMY: ICD-10-CM

## 2025-03-20 DIAGNOSIS — O99.212 SEVERE OBESITY DUE TO EXCESS CALORIES AFFECTING PREGNANCY IN SECOND TRIMESTER: ICD-10-CM

## 2025-03-20 DIAGNOSIS — O28.5 ABNORMAL GENETIC TEST DURING PREGNANCY: Primary | ICD-10-CM

## 2025-04-04 NOTE — PROGRESS NOTES
"  Maternal Fetal Medicine Follow Up    Subjective:     Patient ID: [id number]    Chief Complaint: [facility] follow up with [facility]      HPI: Coco Cee is a 30 y.o. female  at 26w2d gestation with Estimated Date of Delivery: 25  who is here for follow-up consultation by M.    Her initial cell free DNA showed low fetal fraction.  Her repeat cell free DNA was negative.  She declined amniocentesis.  She had elevated BMI greater than 36 at initial OB visit. She is on low-dose aspirin twice daily for preeclampsia prophylaxis.  She is here to complete fetal anatomy ultrasound.  Patient was accompanied by her mother.       Interval history since last M visit: None.. She denies any leaking fluid, vaginal bleeding, contractions, decreased fetal movement. Denies headaches, visual disturbances, or epigastric pain.    Pregnancy complications include:   Problem List[1]    No changes to medical, surgical, family, social, or obstetric history.    Medications:  Current Outpatient Medications   Medication Instructions    aspirin (ECOTRIN) 81 mg, Oral, 2 times daily    cetirizine (ZYRTEC) 10 mg, Daily    fluticasone propionate (FLONASE) 50 mcg/actuation nasal spray 1 spray, Daily    prenatal 168-iron-folic-omega3 (ONE-A-DAY PRENATAL-1) 27 mg iron- 800 mcg-235 mg Cap Take by mouth.       Review of Systems   12 point review of systems conducted, negative except as stated in the history of present illness. See HPI for details.      Objective:     Visit Vitals  /68 (BP Location: Left arm, Patient Position: Sitting)   Pulse 78   Ht 5' 2" (1.575 m)   Wt 95.7 kg (211 lb)   LMP 10/09/2024 (Approximate)   BMI 38.59 kg/m²        Physical Exam  Vitals and nursing note reviewed.   Constitutional:       Appearance: Normal appearance.      Comments: Increased BMI   HENT:      Head: Normocephalic and atraumatic.      Nose: Nose normal. No congestion.   Cardiovascular:      Rate and Rhythm: Normal rate.   Pulmonary:      Effort: " Pulmonary effort is normal.   Skin:     Findings: No rash.   Neurological:      Mental Status: She is alert and oriented to person, place, and time.   Psychiatric:         Mood and Affect: Mood normal.         Behavior: Behavior normal.         Thought Content: Thought content normal.         Judgment: Judgment normal.         Assessment/Plan:     30 y.o.  female with IUP at 26w2d    Initial cell free DNA with low fetal fraction, with normal repeat  There is normal fetal growth with an EFW of 1044 g at the 61% and the AC at the 66% on 25  AFV is normal today, 2025     Repeat cell free DNA testing was low risk. She declined amniocentesis.  Reviewed the need for routine  evaluation.      Elevated BMI greater than 35  Body mass index is 38.59 kg/m². With excessive gain of 8 lb in 1 month, she was advised to Decrease caloric intake and avoid further excessive weight. Excess weight gain would be associated with gestational hypertension, gestational diabetes and adverse  outcomes, including fetal demise in utero.    We recommend 1 hour GCT between 24-28 weeks' gestation.    With elevated BMI, start fetal testing starting around 37 weeks. Fetal kick count instructions reviewed.     It is important to lose weight after the pregnancy is over, especially before a future pregnancy was discussed. Breastfeeding may be an important tool in reducing the postpartum weight retention. Fetal risks were discussed with short term risk of fetal/ obesity and long term risk of adolescent component of metabolic syndrome.       Preeclampsia prophylaxis  She was advised to continue asa 81 mg BID until 34 6/7 weeks, then decrease to once daily until delivery. Preeclampsia precautions reviewed.     Fetal growth is normal.  Fetal anatomy scan was completed today with no follow-up appointment scheduled.  Patient will continue follow-up with Dr. Griffiths.  Fetal kick count instructions and preeclampsia warnings.   Importance of healthy diet and limiting excess weight gain with the risks reviewed.  Patient will continue aspirin b.i.d. till 34 weeks then decrease to daily at 35 weeks.    Follow up for No follow up scheduled.  Keep F/U with primary OB, We will see any time at OB's discretion.     Future Appointments   Date Time Provider Department Center   9/10/2025 10:00 AM Alba Santillan, DO [facility]      DEBBIE involvement: Patient was evaluated and examined by Dr. Galvan. GILDA Dalal, helped in pre charting of part of note.    This note was created with the assistance of "[employer] voice recognition software. There may be transcription errors as a result of using this technology, however minimal. Effort has been made to ensure accuracy of transcription, but any obvious errors or omissions should be clarified with the author of the document.             [1]   Patient Active Problem List  Diagnosis    Abnormal genetic test during pregnancy    Increased BMI affecting pregnancy in second trimester    Excessive weight gain in pregnancy in second trimester

## 2025-04-07 ENCOUNTER — PROCEDURE VISIT (OUTPATIENT)
Dept: MATERNAL FETAL MEDICINE | Facility: CLINIC | Age: 30
End: 2025-04-07
Payer: COMMERCIAL

## 2025-04-07 ENCOUNTER — OFFICE VISIT (OUTPATIENT)
Dept: MATERNAL FETAL MEDICINE | Facility: CLINIC | Age: 30
End: 2025-04-07
Payer: COMMERCIAL

## 2025-04-07 VITALS
DIASTOLIC BLOOD PRESSURE: 68 MMHG | SYSTOLIC BLOOD PRESSURE: 115 MMHG | WEIGHT: 211 LBS | HEART RATE: 78 BPM | HEIGHT: 62 IN | BODY MASS INDEX: 38.83 KG/M2

## 2025-04-07 DIAGNOSIS — O28.5 ABNORMAL GENETIC TEST DURING PREGNANCY: ICD-10-CM

## 2025-04-07 DIAGNOSIS — O99.212 SEVERE OBESITY DUE TO EXCESS CALORIES AFFECTING PREGNANCY IN SECOND TRIMESTER: ICD-10-CM

## 2025-04-07 DIAGNOSIS — E66.01 SEVERE OBESITY DUE TO EXCESS CALORIES AFFECTING PREGNANCY IN SECOND TRIMESTER: ICD-10-CM

## 2025-04-07 DIAGNOSIS — O28.5 ABNORMAL GENETIC TEST DURING PREGNANCY: Primary | ICD-10-CM

## 2025-04-07 DIAGNOSIS — O26.02 EXCESSIVE WEIGHT GAIN IN PREGNANCY IN SECOND TRIMESTER: ICD-10-CM

## 2025-04-07 DIAGNOSIS — Z36.2 ENCOUNTER FOR FOLLOW-UP ULTRASOUND OF FETAL ANATOMY: ICD-10-CM

## 2025-04-07 PROCEDURE — 3074F SYST BP LT 130 MM HG: CPT | Mod: CPTII,S$GLB,, | Performed by: OBSTETRICS & GYNECOLOGY

## 2025-04-07 PROCEDURE — 3008F BODY MASS INDEX DOCD: CPT | Mod: CPTII,S$GLB,, | Performed by: OBSTETRICS & GYNECOLOGY

## 2025-04-07 PROCEDURE — 76816 OB US FOLLOW-UP PER FETUS: CPT | Mod: S$GLB,,, | Performed by: OBSTETRICS & GYNECOLOGY

## 2025-04-07 PROCEDURE — 1160F RVW MEDS BY RX/DR IN RCRD: CPT | Mod: CPTII,S$GLB,, | Performed by: OBSTETRICS & GYNECOLOGY

## 2025-04-07 PROCEDURE — 3078F DIAST BP <80 MM HG: CPT | Mod: CPTII,S$GLB,, | Performed by: OBSTETRICS & GYNECOLOGY

## 2025-04-07 PROCEDURE — 1159F MED LIST DOCD IN RCRD: CPT | Mod: CPTII,S$GLB,, | Performed by: OBSTETRICS & GYNECOLOGY

## 2025-04-07 PROCEDURE — 99213 OFFICE O/P EST LOW 20 MIN: CPT | Mod: S$GLB,,, | Performed by: OBSTETRICS & GYNECOLOGY
